# Patient Record
Sex: MALE | Race: BLACK OR AFRICAN AMERICAN | NOT HISPANIC OR LATINO | Employment: UNEMPLOYED | URBAN - METROPOLITAN AREA
[De-identification: names, ages, dates, MRNs, and addresses within clinical notes are randomized per-mention and may not be internally consistent; named-entity substitution may affect disease eponyms.]

---

## 2021-09-23 ENCOUNTER — HOSPITAL ENCOUNTER (EMERGENCY)
Facility: CLINIC | Age: 21
Discharge: HOME | End: 2021-09-24
Attending: PHYSICIAN ASSISTANT | Admitting: PHYSICIAN ASSISTANT
Payer: COMMERCIAL

## 2021-09-23 VITALS
HEART RATE: 94 BPM | DIASTOLIC BLOOD PRESSURE: 81 MMHG | SYSTOLIC BLOOD PRESSURE: 139 MMHG | RESPIRATION RATE: 18 BRPM | HEIGHT: 70 IN | BODY MASS INDEX: 40.09 KG/M2 | WEIGHT: 280 LBS | OXYGEN SATURATION: 98 % | TEMPERATURE: 98.1 F

## 2021-09-23 DIAGNOSIS — K05.10 GINGIVITIS: ICD-10-CM

## 2021-09-23 DIAGNOSIS — K08.89 PAIN, DENTAL: ICD-10-CM

## 2021-09-23 PROCEDURE — 99282 EMERGENCY DEPT VISIT SF MDM: CPT

## 2021-09-23 RX ORDER — PENICILLIN V POTASSIUM 500 MG/1
500 TABLET, FILM COATED ORAL 4 TIMES DAILY
Qty: 40 TABLET | Refills: 0 | Status: SHIPPED | OUTPATIENT
Start: 2021-09-23 | End: 2021-10-03

## 2021-09-23 ASSESSMENT — ENCOUNTER SYMPTOMS: FEVER: 0

## 2021-09-23 ASSESSMENT — MIFFLIN-ST. JEOR: SCORE: 2271.32

## 2021-09-24 NOTE — DISCHARGE INSTRUCTIONS
*Soft or liquid diet.  *Take medications as prescribed.   *Follow-up with your dentist as soon as possible.  *Return if you develop fever, difficulty opening your mouth or swallowing, Swelling under your chin or over your face, faint or feel like you will faint or become worse in any way.    Discharge Instructions  Dental Pain    You have been seen today for a toothache. Your pain may be caused by an exposed nerve, an infection (pulpitis), a root abscess, or other problems. You will need to see a dentist for a solution to your tooth problem. Emergency Department care is only to help control your problem until you can see a dentist.  Today, we did not find any sign that your toothache was caused by a serious condition, but sometimes symptoms develop over time and cannot be found during an emergency visit, so it is very important that you follow up with your dentist.      Return to the Emergency Department if:  You develop a fever over 101 degrees Fahrenheit  You can t open your mouth normally, can t move your tongue well, or can t swallow  You have new or increased swelling of your face or neck.  You develop drainage of pus or foul smelling material from around your tooth.  What can I do to help myself?  Take any antibiotic the doctor may have prescribed for you today.  Avoid very hot or very cold foods as both can cause pain.  Make an appointment to see a dentist as soon as possible. If you wish, we can provide you with a list of low-cost dental clinics.       Remember that you can always come back to the Emergency Department if you are not able to see your regular doctor in the amount of time listed above, if you get any new symptoms, or if there is anything that worries you.

## 2021-09-24 NOTE — ED PROVIDER NOTES
"  History     Chief Complaint:  Dental Pain       HPI  David Griffith is a 21 year old otherwise healthy male who presents for evaluation of dental pain. The patient reports lower dental pain that started last week. Denies any fevers.      Review of Systems   Constitutional: Negative for fever.   HENT: Positive for dental problem.        Allergies:  No Known Drug Allergies    Medications:    Medications reviewed. No current medications.     Past Medical History:    Medical history reviewed. No past medical history.    Social History:  The patient was unaccompanied to the ED.  PCP: No primary care provider on file.    Physical Exam     Patient Vitals for the past 24 hrs:   BP Temp Temp src Pulse Resp SpO2 Height Weight   09/23/21 2311 139/81 98.1  F (36.7  C) Temporal 94 18 98 % 1.778 m (5' 10\") 127 kg (280 lb)      Physical Exam  General: Alert, cooperative   Head:  Scalp is atraumatic.  Eyes:  The pupils are equal, round, and reactive to light. Normal conjunctiva.   ENT:                                      Ears:  The external ears are normal. TM's non-erythematous. External canals normal.    Nose:  The external nose is normal.  Throat:  The oropharynx is normal. Mucus membranes are moist.    Very poor dentition.  Top and bottom anterior gingiva appear erythematous.  Tenderness to teeth 24-26.  No facial swelling.  No periapical abscess.  Neck:  Normal range of motion.   CV:   2+ radial pulses  Resp:  Non-labored, no retractions or accessory muscle use.  MS:  Normal range of motion. No acute deformities.   Skin:  Warm and dry. No rash.   Neuro:   Alert. Strength and sensation grossly intact.   Psych: Awake. Alert.  Appropriate interactions.       Emergency Department Course   Emergency Department Course:  Reviewed:  2345 I reviewed nursing notes, vitals, past medical history, and care everywhere.    Assessments:  2350 I obtained history and examined the patient as noted above.     Disposition:  The patient was " discharged to home.   Impression & Plan     CMS Diagnoses: None    Medical Decision Making:  David Griffith is a 21 year old male who presents for evaluation of dental pain. There is no abscess detected around the tooth amenable to incision and drainage.  The differential diagnosis includes: cracked tooth syndrome, pulpitis, sub-apical abscess, amongst others.  There appears to be gingivitis and concern for possible secondary infection, will cover with penicillin.  There is no evidence of deep space infection.  Follow-up closely with dentist and return precautions discussed including fevers, facial swelling, or any other concerning symptoms develop.      Diagnosis:     ICD-10-CM    1. Pain, dental  K08.89    2. Gingivitis  K05.10         Discharge Medications:  New Prescriptions    PENICILLIN V (VEETID) 500 MG TABLET    Take 1 tablet (500 mg) by mouth 4 times daily for 10 days       Scribe Disclosure:  I, Meseret Crawley, am serving as a scribe at 11:59 PM on 9/23/2021 to document services personally performed by Linda Vance PA-C based on my observations and the provider's statements to me.    [facility]               Linda Vance PA-C  09/24/21 0052

## 2021-09-24 NOTE — ED TRIAGE NOTES
Here for dental pain to front upper and lower tooth started last week. Has dentist schedule 2 months out, unable to get in any earlier. thinks he is having drainage due having some weird taste in his mouth, concern for infection. Have been taking tylenol for pain, last dose yesterday night. ABCs intact.